# Patient Record
Sex: MALE | Race: WHITE | NOT HISPANIC OR LATINO | Employment: OTHER | URBAN - METROPOLITAN AREA
[De-identification: names, ages, dates, MRNs, and addresses within clinical notes are randomized per-mention and may not be internally consistent; named-entity substitution may affect disease eponyms.]

---

## 2017-01-11 ENCOUNTER — OFFICE VISIT (OUTPATIENT)
Dept: INTERNAL MEDICINE | Facility: CLINIC | Age: 72
End: 2017-01-11

## 2017-01-11 VITALS
SYSTOLIC BLOOD PRESSURE: 148 MMHG | WEIGHT: 187 LBS | BODY MASS INDEX: 29.35 KG/M2 | HEART RATE: 64 BPM | HEIGHT: 67 IN | OXYGEN SATURATION: 99 % | DIASTOLIC BLOOD PRESSURE: 82 MMHG

## 2017-01-11 DIAGNOSIS — I10 ESSENTIAL HYPERTENSION: Primary | ICD-10-CM

## 2017-01-11 DIAGNOSIS — F41.9 ANXIETY: ICD-10-CM

## 2017-01-11 DIAGNOSIS — E78.2 MIXED HYPERLIPIDEMIA: ICD-10-CM

## 2017-01-11 DIAGNOSIS — E03.8 OTHER SPECIFIED HYPOTHYROIDISM: ICD-10-CM

## 2017-01-11 DIAGNOSIS — F32.9 REACTIVE DEPRESSION: ICD-10-CM

## 2017-01-11 PROCEDURE — 99214 OFFICE O/P EST MOD 30 MIN: CPT | Performed by: HOSPITALIST

## 2017-01-11 RX ORDER — ROSUVASTATIN CALCIUM 20 MG/1
20 TABLET, COATED ORAL NIGHTLY
Qty: 90 TABLET | Refills: 0 | Status: SHIPPED | OUTPATIENT
Start: 2017-01-11 | End: 2017-02-20 | Stop reason: SDUPTHER

## 2017-01-11 RX ORDER — ROSUVASTATIN CALCIUM 20 MG/1
20 TABLET, COATED ORAL NIGHTLY
Qty: 90 TABLET | Refills: 1 | Status: SHIPPED | OUTPATIENT
Start: 2017-01-11

## 2017-01-11 NOTE — PROGRESS NOTES
"Subjective   Kimani Darby is a 71 y.o. male.   Essential hypertension; Obstruction of carotid artery, left; Other specified hypothyroidism; Reactive depression; Mixed hyperlipidemia; and Med Refill      HPI Comments: His blood pressure is up a little. He has been inactive and he has callouses  on his right foot. He has broken that ankle and has lost some sensation in that foot since the surgery. He has to go to [place]. He will return in 1 month. He is putting an antiseptic with phenol and he states he is seeing some results with it. He does not want to see a podiatrist right now. He is on Crestor and he was getting it from the [facility] but they no longer can. He says his sister-in-law who found it for a reasonable price down in [place].       The following portions of the patient's history were reviewed and updated as appropriate: allergies, current medications, past family history, past medical history, past social history, past surgical history and problem list.    Review of Systems   Constitutional: Negative for activity change and appetite change.   HENT: Negative for congestion and dental problem.    Eyes: Negative for discharge and itching.   Respiratory: Negative for apnea and chest tightness.    Cardiovascular: Negative for chest pain and leg swelling.   Gastrointestinal: Negative for abdominal distention and abdominal pain.   Endocrine: Negative for heat intolerance and polydipsia.   Genitourinary: Negative for difficulty urinating and dysuria.   Musculoskeletal: Negative for arthralgias and back pain.   Neurological: Negative for light-headedness and numbness.   Psychiatric/Behavioral: Positive for dysphoric mood.       Objective  Blood pressure 148/82, pulse 64, height 67\" (170.2 cm), weight 187 lb (84.8 kg), SpO2 99 %.      Physical Exam   Constitutional: He is oriented to person, place, and time. He appears well-developed and well-nourished.   HENT:   Head: Normocephalic and " atraumatic.   Eyes: Conjunctivae and EOM are normal. Pupils are equal, round, and reactive to light.   Neck: Normal range of motion. Neck supple.   Cardiovascular: Normal rate, regular rhythm, normal heart sounds and intact distal pulses.    Pulmonary/Chest: Effort normal and breath sounds normal.   Musculoskeletal: Normal range of motion.   Neurological: He is alert and oriented to person, place, and time. He has normal reflexes.   Skin: Skin is warm and dry.   Psychiatric: He has a normal mood and affect. His behavior is normal. Judgment and thought content normal.       Assessment/Plan   Kimani was seen today for essential hypertension, obstruction of carotid artery, left, other specified hypothyroidism, reactive depression, mixed hyperlipidemia and med refill.    Diagnoses and all orders for this visit:    Essential hypertension    Other specified hypothyroidism    Reactive depression    Mixed hyperlipidemia  -     rosuvastatin (CRESTOR) 20 MG tablet; Take 1 tablet by mouth Every Night.  -     rosuvastatin (CRESTOR) 20 MG tablet; Take 1 tablet by mouth Every Night.    Anxiety

## 2017-01-11 NOTE — MR AVS SNAPSHOT
Kimani Darby   1/11/2017 8:15 AM   Office Visit    Dept Phone:  [phone]   Encounter #:  [id number]    Provider:  Bernarda Nina MD   Department:  [facility] INTER[facility] MEDICINE AND ENDOCRINOLOGY [place]                Your Full Care Plan              Today's Medication Changes          These changes are accurate as of: 1/11/17  9:37 AM.  If you have any questions, ask your nurse or doctor.               Medication(s)that have changed:     * rosuvastatin 20 MG tablet   Commonly known as:  CRESTOR   Take 1 tablet by mouth Every Night.   What changed:  Another medication with the same name was added. Make sure you understand how and when to take each.   Changed by:  Bernarda Nina MD       * rosuvastatin 20 MG tablet   Commonly known as:  CRESTOR   Take 1 tablet by mouth Every Night.   What changed:  You were already taking a medication with the same name, and this prescription was added. Make sure you understand how and when to take each.   Changed by:  Bernarda Nina MD       * Notice:  This list has 2 medication(s) that are the same as other medications prescribed for you. Read the directions carefully, and ask your doctor or other care provider to review them with you.         Where to Get Your Medications      You can get these medications from any pharmacy     Bring a paper prescription for each of these medications     rosuvastatin 20 MG tablet    rosuvastatin 20 MG tablet                  Your Updated Medication List          This list is accurate as of: 1/11/17  9:37 AM.  Always use your most recent med list.                amLODIPine 5 MG tablet   Commonly known as:  NORVASC       aspirin 81 MG tablet       clopidogrel 75 MG tablet   Commonly known as:  PLAVIX       escitalopram 20 MG tablet   Commonly known as:  LEXAPRO       hydrochlorothiazide 12.5 MG capsule   Commonly known as:  MICROZIDE       levothyroxine 50 MCG tablet   Commonly known as:  SYNTHROID, LEVOTHROID       * rosuvastatin 20 MG tablet   Commonly known as:  CRESTOR   Take 1 tablet by mouth Every Night.       * rosuvastatin 20 MG tablet   Commonly known as:  CRESTOR   Take 1 tablet by mouth Every Night.       Vitamin D 2000 UNITS tablet       * Notice:  This list has 2 medication(s) that are the same as other medications prescribed for you. Read the directions carefully, and ask your doctor or other care provider to review them with you.            You Were Diagnosed With        Codes Comments    Essential hypertension    -  Primary ICD-10-CM: I10  ICD-9-CM: 401.9     Other specified hypothyroidism     ICD-10-CM: E03.8  ICD-9-CM: 244.8     Reactive depression     ICD-10-CM: F32.9  ICD-9-CM: 300.4     Mixed hyperlipidemia     ICD-10-CM: E78.2  ICD-9-CM: 272.2     Anxiety     ICD-10-CM: F41.9  ICD-9-CM: 300.00       Instructions     None    Patient Instructions History      Upcoming Appointments     Visit Type Date Time Department    FOLLOW UP 2017  8:15 AM [facility]    FOLLOW UP 2017  8:00 AM [facility]      [facility] Signup     [facility] allows you to send messages to your doctor, view your test results, renew your prescriptions, schedule appointments, and more. To sign up, go to [employer] and click on the Sign Up Now link in the New User? box. Enter your [facility] Activation Code exactly as it appears below along with the last four digits of your Social Security Number and your Date of Birth () to complete the sign-up process. If you do not sign up before the expiration date, you must request a new code.    [facility] Activation Code: [email or web address]  Expires: 2017  9:37 AM    If you have questions, you can email [email or web address] or call [phone] to talk to our [facility] staff. Remember, [employer] [facility] is NOT to be used for urgent needs. For medical emergencies, dial 911.               Other Info from Your Visit           Your Appointments     2017   "8:00 AM EDT   Follow Up with Bernarda Nina MD   [facility] INTERNAL MEDICINE AND ENDOCRINOLOGY [place] (--)    [address] [address] [phone]           Arrive 15 minutes prior to appointment.              Allergies     Ramipril        Reason for Visit     Essential hypertension     Obstruction of carotid artery, left     Other specified hypothyroidism     Reactive depression     Mixed hyperlipidemia     Med Refill           Vital Signs     Blood Pressure Pulse Height Weight Oxygen Saturation Body Mass Index    148/82 64 67\" (170.2 cm) 187 lb (84.8 kg) 99% 29.29 kg/m2    Smoking Status                   Former Smoker           Problems and Diagnoses Noted     Anxiety problem    Depression    High cholesterol or triglycerides    High blood pressure    Underactive thyroid        "

## 2017-01-13 ENCOUNTER — LAB (OUTPATIENT)
Dept: INTERNAL MEDICINE | Facility: CLINIC | Age: 72
End: 2017-01-13

## 2017-01-13 VITALS — HEART RATE: 57 BPM | DIASTOLIC BLOOD PRESSURE: 76 MMHG | SYSTOLIC BLOOD PRESSURE: 126 MMHG | OXYGEN SATURATION: 99 %

## 2017-02-20 ENCOUNTER — OFFICE VISIT (OUTPATIENT)
Dept: INTERNAL MEDICINE | Facility: CLINIC | Age: 72
End: 2017-02-20

## 2017-02-20 VITALS
BODY MASS INDEX: 29.19 KG/M2 | SYSTOLIC BLOOD PRESSURE: 122 MMHG | DIASTOLIC BLOOD PRESSURE: 74 MMHG | TEMPERATURE: 99 F | WEIGHT: 186 LBS | HEIGHT: 67 IN

## 2017-02-20 DIAGNOSIS — M79.604 LOWER EXTREMITY PAIN, RIGHT: Primary | ICD-10-CM

## 2017-02-20 PROCEDURE — 99213 OFFICE O/P EST LOW 20 MIN: CPT | Performed by: NURSE PRACTITIONER

## 2017-02-20 NOTE — PROGRESS NOTES
"Subjective  Right knee pain      Kimani Darby is a 71 y.o. male.   Allergies   Allergen Reactions   • Ramipril      History of Present Illness      Right knee pain x 1 week, was down on concrete working on a motor, did not know anything was wrong but when he looked at it he saw was swollen   And pink, with some tenderness , no ice to it , no otc meds tried , if walks on leg it makes hurt worse , is a constant ache   The following portions of the patient's history were reviewed and updated as appropriate: allergies, current medications, past family history, past medical history, past social history, past surgical history and problem list.    Review of Systems   Musculoskeletal: Positive for myalgias.   All other systems reviewed and are negative.      Objective   Physical Exam   Constitutional: He appears well-developed and well-nourished.   HENT:   Head: Normocephalic.   Eyes: Conjunctivae are normal.   Cardiovascular: Regular rhythm and normal heart sounds.    Pulmonary/Chest: Effort normal and breath sounds normal.   Neurological: He is alert.   Skin: Skin is warm and dry.        Psychiatric: He has a normal mood and affect. His behavior is normal. Judgment and thought content normal.   Nursing note and vitals reviewed.    Visit Vitals   • /74   • Temp 99 °F (37.2 °C)   • Ht 67\" (170.2 cm)   • Wt 186 lb (84.4 kg)   • BMI 29.13 kg/m2       Assessment/Plan     Problem List Items Addressed This Visit     None      Visit Diagnoses     Lower extremity pain, right    -  Primary    Relevant Orders    Duplex Venous Lower Extremity - Right CAR               "

## 2017-02-21 ENCOUNTER — TELEPHONE (OUTPATIENT)
Dept: INTERNAL MEDICINE | Facility: CLINIC | Age: 72
End: 2017-02-21

## 2017-03-30 ENCOUNTER — TELEPHONE (OUTPATIENT)
Dept: INTERNAL MEDICINE | Facility: CLINIC | Age: 72
End: 2017-03-30

## 2017-03-30 RX ORDER — CLOPIDOGREL BISULFATE 75 MG/1
75 TABLET ORAL DAILY
Qty: 30 TABLET | Refills: 11 | Status: SHIPPED | OUTPATIENT
Start: 2017-03-30

## 2017-03-30 RX ORDER — ESCITALOPRAM OXALATE 20 MG/1
20 TABLET ORAL DAILY
Qty: 30 TABLET | Refills: 11 | Status: SHIPPED | OUTPATIENT
Start: 2017-03-30

## 2017-03-30 RX ORDER — AMLODIPINE BESYLATE 5 MG/1
5 TABLET ORAL DAILY
Qty: 30 TABLET | Refills: 11 | Status: SHIPPED | OUTPATIENT
Start: 2017-03-30

## 2017-03-30 RX ORDER — LEVOTHYROXINE SODIUM 0.05 MG/1
50 TABLET ORAL DAILY
Qty: 30 TABLET | Refills: 11 | Status: SHIPPED | OUTPATIENT
Start: 2017-03-30

## 2017-03-30 RX ORDER — HYDROCHLOROTHIAZIDE 12.5 MG/1
12.5 CAPSULE, GELATIN COATED ORAL DAILY
Qty: 30 CAPSULE | Refills: 11 | Status: SHIPPED | OUTPATIENT
Start: 2017-03-30

## 2017-03-30 NOTE — TELEPHONE ENCOUNTER
----- Message from [place] sent at 3/30/2017 11:19 AM EDT -----  [phone]    PT SAID HE HAD CALLED ABOUT TWO WEEKS AGO NEEDING REFILLS ON 5 MEDICATIONS, HE CALLED AGAIN EARLY THIS MORNING AND SAID HE HASN'T HEARD ANYTHING. I STATED THAT WE HAVE 48 HOURS FOR ANY PRESCRIPTIONS NEEDING SENT TO THE PHARMACY.     HE SAID CAN SOMEONE PLEASE CALL HIM, HE DIDN'T KNOW THE NAME OF THE ONES HE NEEDED REFILLED.     HE WAS VERY NICE ON THE PHONE, JUST HATES NOT TO HEAR BACK FROM ANYONE. HE SAID HEW AS COMPLETELY OUT OF HIS MEDS AFTER TRYING TO GET IN CONTACT WITH SOMEONE FOR TWO WEEKS NOW.

## 2017-03-31 ENCOUNTER — OFFICE VISIT (OUTPATIENT)
Dept: INTERNAL MEDICINE | Facility: CLINIC | Age: 72
End: 2017-03-31

## 2017-03-31 VITALS
HEART RATE: 61 BPM | HEIGHT: 67 IN | BODY MASS INDEX: 28.41 KG/M2 | OXYGEN SATURATION: 99 % | SYSTOLIC BLOOD PRESSURE: 150 MMHG | DIASTOLIC BLOOD PRESSURE: 82 MMHG | WEIGHT: 181 LBS

## 2017-03-31 DIAGNOSIS — E03.8 OTHER SPECIFIED HYPOTHYROIDISM: Primary | ICD-10-CM

## 2017-03-31 DIAGNOSIS — E78.2 MIXED HYPERLIPIDEMIA: ICD-10-CM

## 2017-03-31 DIAGNOSIS — I10 ESSENTIAL HYPERTENSION: ICD-10-CM

## 2017-03-31 PROCEDURE — 99213 OFFICE O/P EST LOW 20 MIN: CPT | Performed by: HOSPITALIST

## 2018-04-26 NOTE — PROGRESS NOTES
Rx routed for approval. Subjective   Kimani Darby is a 71 y.o. male. Essential hypertension; Other specified hypothyroidism; Reactive depression; Mixed hyperlipidemia; Anxiety; and Lower extremity pain, right         HPI Comments: He is doing well he but he  had some trouble getting his meds called in. He is doing better with his right leg which swelled up while he was in [place]. It has gone down now. He had no clots in his legs      The following portions of the patient's history were reviewed and updated as appropriate: allergies, current medications, past family history, past medical history, past social history, past surgical history and problem list.    Review of Systems   Constitutional: Negative for activity change and appetite change.   HENT: Negative for congestion and ear discharge.    Eyes: Negative for discharge and itching.   Respiratory: Negative for apnea and chest tightness.    Cardiovascular: Negative for chest pain and leg swelling.   Endocrine: Negative for cold intolerance and heat intolerance.   Genitourinary: Negative for difficulty urinating and dysuria.   Musculoskeletal: Negative for arthralgias and back pain.       Objective   Vitals:    03/31/17 1221   BP: 150/82   Pulse: 61   SpO2: 99%       Physical Exam   Constitutional: He appears well-developed and well-nourished.   HENT:   Head: Normocephalic and atraumatic.   Eyes: EOM are normal. Pupils are equal, round, and reactive to light.   Neck: Normal range of motion. Neck supple.   Cardiovascular: Normal rate and regular rhythm.    Pulmonary/Chest: Effort normal and breath sounds normal.   Psychiatric: He has a normal mood and affect. His behavior is normal. Judgment and thought content normal.       Assessment/Plan   Kimani was seen today for essential hypertension, other specified hypothyroidism, reactive depression, mixed hyperlipidemia, anxiety and lower extremity pain, right.    Diagnoses and all orders for this visit:    Other specified  hypothyroidism    Essential hypertension    Mixed hyperlipidemia      Results for orders placed or performed in visit on 10/12/16   Comprehensive Metabolic Panel   Result Value Ref Range    Glucose 88 70 - 100 mg/dL    BUN 23 9 - 23 mg/dL    Creatinine 0.90 0.60 - 1.30 mg/dL    Sodium 142 132 - 146 mmol/L    Potassium 3.5 3.5 - 5.5 mmol/L    Chloride 105 99 - 109 mmol/L    CO2 33.0 (H) 20.0 - 31.0 mmol/L    Calcium 9.3 8.7 - 10.4 mg/dL    Total Protein 7.2 5.7 - 8.2 g/dL    Albumin 4.60 3.20 - 4.80 g/dL    ALT (SGPT) 22 7 - 40 U/L    AST (SGOT) 23 0 - 33 U/L    Alkaline Phosphatase 85 25 - 100 U/L    Total Bilirubin 0.9 0.3 - 1.2 mg/dL    eGFR Non African Amer 84 >60 mL/min/1.73    Globulin 2.6 gm/dL    A/G Ratio 1.8 g/dL    BUN/Creatinine Ratio 25.6 (H) 7.0 - 25.0    Anion Gap 4.0 3.0 - 11.0 mmol/L   TSH   Result Value Ref Range    TSH 1.461 0.350 - 5.350 mIU/mL   Lipid Panel   Result Value Ref Range    Total Cholesterol 131 0 - 200 mg/dL    Triglycerides 78 0 - 150 mg/dL    HDL Cholesterol 33 (L) 40 - 60 mg/dL    LDL Cholesterol  86 0 - 130 mg/dL